# Patient Record
Sex: FEMALE | NOT HISPANIC OR LATINO | URBAN - METROPOLITAN AREA
[De-identification: names, ages, dates, MRNs, and addresses within clinical notes are randomized per-mention and may not be internally consistent; named-entity substitution may affect disease eponyms.]

---

## 2020-07-17 ENCOUNTER — OFFICE VISIT (OUTPATIENT)
Dept: URBAN - METROPOLITAN AREA CLINIC 54 | Facility: CLINIC | Age: 55
End: 2020-07-17

## 2020-08-07 ENCOUNTER — OFFICE VISIT (OUTPATIENT)
Dept: URBAN - METROPOLITAN AREA CLINIC 54 | Facility: CLINIC | Age: 55
End: 2020-08-07

## 2020-08-14 ENCOUNTER — OFFICE VISIT (OUTPATIENT)
Dept: URBAN - METROPOLITAN AREA CLINIC 54 | Facility: CLINIC | Age: 55
End: 2020-08-14
Payer: COMMERCIAL

## 2020-08-14 DIAGNOSIS — R74.8 ABNORMAL LIVER ENZYMES: ICD-10-CM

## 2020-08-14 PROCEDURE — 1036F TOBACCO NON-USER: CPT | Performed by: INTERNAL MEDICINE

## 2020-08-14 PROCEDURE — 99213 OFFICE O/P EST LOW 20 MIN: CPT | Performed by: INTERNAL MEDICINE

## 2020-08-14 PROCEDURE — G8427 DOCREV CUR MEDS BY ELIG CLIN: HCPCS | Performed by: INTERNAL MEDICINE

## 2020-08-14 PROCEDURE — 3017F COLORECTAL CA SCREEN DOC REV: CPT | Performed by: INTERNAL MEDICINE

## 2020-08-14 RX ORDER — LOSARTAN POTASSIUM 25 MG/1
TABLET, FILM COATED ORAL
Qty: 0 | Refills: 0 | Status: ACTIVE | COMMUNITY
Start: 1900-01-01

## 2020-08-14 RX ORDER — ATORVASTATIN CALCIUM 20 MG/1
TABLET, FILM COATED ORAL
Qty: 0 | Refills: 0 | Status: ACTIVE | COMMUNITY
Start: 1900-01-01

## 2020-08-14 NOTE — HPI-TODAY'S VISIT:
6-month follow-up for abnormal liver profile.  She feels physically well.  Her   in March from a pulmonary embolism following surgery.  She has no ongoing GI or liver issues.  She remains on the same medications.  Prior evaluation revealed no treatable forms of liver disease.

## 2020-08-14 NOTE — HPI-OTHER HISTORIES
>>>prior visit  The patient is a 54 year old female, who presents on referral from Renee Parrish MD, for a gastroenterology evaluation for elevated liver enzymes. A copy of this document will be sent to the referring provider. Recent liver function tests, AST and ALT , are elevated (see labs which were reviewed in the patients records ). ALT>AST both <180, normal last year, not improved after stopping statin 2 months. The patient reports no significant symptoms related to the elevated LFT's. The patient relates no significant family or personal history of liver disease. She states no history of new medications or alcohol use. The patient reports a personal history of no other habits that could cause liver damage. Mild transient RUQ pain intermittently, lasting minutes. Interval testing includes: CMP. The patient would also like to be evaluated for a screening colonoscopy. The patient has never had a colonoscopy before. The patient does not have any risk factors for colon cancer, but is over the recommended age for screening. There is no recent history of rectal bleeding. The patient has no pertinent additional complaints of constipation, diarrhea, and weight loss. >>> 10/25/2019 No treatable forms of liver disease identified. No fibrosis on FibroSure, so can avoid a liver biopsy for now. Hemochromatosis DNA negative, no autoimmune or viral hepatitis.Feels well, has lost 10 lbs volitionally. Recent screening colonoscopy was normal.

## 2020-08-21 LAB
A/G RATIO: 1.8
ALBUMIN: 4.7
ALKALINE PHOSPHATASE: 78
ALT (SGPT): 48
AST (SGOT): 41
BASO (ABSOLUTE): 0.1
BASOS: 1
BILIRUBIN, TOTAL: 0.5
BUN/CREATININE RATIO: 13
BUN: 9
CALCIUM: 9.9
CARBON DIOXIDE, TOTAL: 22
CHLORIDE: 99
CREATININE: 0.72
EGFR IF AFRICN AM: 111
EGFR IF NONAFRICN AM: 96
EOS (ABSOLUTE): 0.2
EOS: 2
GLOBULIN, TOTAL: 2.6
GLUCOSE: 90
HEMATOCRIT: 42.2
HEMATOLOGY COMMENTS:: (no result)
HEMOGLOBIN: 13.3
IMMATURE CELLS: (no result)
IMMATURE GRANS (ABS): 0
IMMATURE GRANULOCYTES: 0
INR: 1
LYMPHS (ABSOLUTE): 3
LYMPHS: 30
MCH: 25.4
MCHC: 31.5
MCV: 81
MONOCYTES(ABSOLUTE): 0.9
MONOCYTES: 9
NEUTROPHILS (ABSOLUTE): 5.7
NEUTROPHILS: 58
NRBC: (no result)
PLATELETS: 321
POTASSIUM: 4.3
PROTEIN, TOTAL: 7.3
PROTHROMBIN TIME: 11
RBC: 5.24
RDW: 13.5
SODIUM: 141
WBC: 9.8

## 2020-09-03 ENCOUNTER — TELEPHONE ENCOUNTER (OUTPATIENT)
Dept: URBAN - METROPOLITAN AREA CLINIC 54 | Facility: CLINIC | Age: 55
End: 2020-09-03

## 2021-02-19 ENCOUNTER — WEB ENCOUNTER (OUTPATIENT)
Dept: URBAN - METROPOLITAN AREA CLINIC 54 | Facility: CLINIC | Age: 56
End: 2021-02-19

## 2021-02-19 ENCOUNTER — OFFICE VISIT (OUTPATIENT)
Dept: URBAN - METROPOLITAN AREA CLINIC 54 | Facility: CLINIC | Age: 56
End: 2021-02-19
Payer: COMMERCIAL

## 2021-02-19 DIAGNOSIS — R74.8 ABNORMAL LIVER ENZYMES: ICD-10-CM

## 2021-02-19 DIAGNOSIS — K76.0 FATTY LIVER: ICD-10-CM

## 2021-02-19 PROCEDURE — G8482 FLU IMMUNIZE ORDER/ADMIN: HCPCS | Performed by: INTERNAL MEDICINE

## 2021-02-19 PROCEDURE — 99213 OFFICE O/P EST LOW 20 MIN: CPT | Performed by: INTERNAL MEDICINE

## 2021-02-19 PROCEDURE — G9903 PT SCRN TBCO ID AS NON USER: HCPCS | Performed by: INTERNAL MEDICINE

## 2021-02-19 PROCEDURE — G8420 CALC BMI NORM PARAMETERS: HCPCS | Performed by: INTERNAL MEDICINE

## 2021-02-19 PROCEDURE — 3017F COLORECTAL CA SCREEN DOC REV: CPT | Performed by: INTERNAL MEDICINE

## 2021-02-19 RX ORDER — ATORVASTATIN CALCIUM 20 MG/1
TABLET, FILM COATED ORAL
Qty: 0 | Refills: 0 | Status: ACTIVE | COMMUNITY
Start: 1900-01-01

## 2021-02-19 RX ORDER — LOSARTAN POTASSIUM 25 MG/1
TABLET, FILM COATED ORAL
Qty: 0 | Refills: 0 | Status: ACTIVE | COMMUNITY
Start: 1900-01-01

## 2021-02-19 NOTE — HPI-TODAY'S VISIT:
55-year-old female seen in follow-up for fatty liver.  She feels well with no specific complaints and no digestive issues.  She brings with her recent lab work which is nearly normal except for minimal elevation of  ALT.  No anemia.  Negative colonoscopy and ultrasound 2019.  FibroSure negative for fibrosis.  Treatable forms of liver disease screening and negative.  No cardiac or respiratory problems.  No genitourinary problems.  She is back on a statin fish oil garlic losartan

## 2021-08-20 ENCOUNTER — OFFICE VISIT (OUTPATIENT)
Dept: URBAN - METROPOLITAN AREA CLINIC 54 | Facility: CLINIC | Age: 56
End: 2021-08-20

## 2021-11-18 ENCOUNTER — OFFICE VISIT (OUTPATIENT)
Dept: URBAN - METROPOLITAN AREA CLINIC 54 | Facility: CLINIC | Age: 56
End: 2021-11-18

## 2021-11-18 ENCOUNTER — OFFICE VISIT (OUTPATIENT)
Dept: URBAN - METROPOLITAN AREA CLINIC 54 | Facility: CLINIC | Age: 56
End: 2021-11-18
Payer: COMMERCIAL

## 2021-11-18 ENCOUNTER — WEB ENCOUNTER (OUTPATIENT)
Dept: URBAN - METROPOLITAN AREA CLINIC 54 | Facility: CLINIC | Age: 56
End: 2021-11-18

## 2021-11-18 ENCOUNTER — DASHBOARD ENCOUNTERS (OUTPATIENT)
Age: 56
End: 2021-11-18

## 2021-11-18 VITALS
BODY MASS INDEX: 33.65 KG/M2 | SYSTOLIC BLOOD PRESSURE: 126 MMHG | DIASTOLIC BLOOD PRESSURE: 74 MMHG | HEART RATE: 103 BPM | HEIGHT: 68 IN | WEIGHT: 222 LBS | TEMPERATURE: 98 F

## 2021-11-18 DIAGNOSIS — Z12.11 ENCOUNTER FOR SCREENING FOR MALIGNANT NEOPLASM OF COLON: ICD-10-CM

## 2021-11-18 DIAGNOSIS — R74.8 ELEVATED LIVER ENZYMES: ICD-10-CM

## 2021-11-18 DIAGNOSIS — K76.0 FATTY LIVER: ICD-10-CM

## 2021-11-18 PROBLEM — 197321007 FATTY LIVER: Status: ACTIVE | Noted: 2021-02-19

## 2021-11-18 PROCEDURE — 99213 OFFICE O/P EST LOW 20 MIN: CPT | Performed by: INTERNAL MEDICINE

## 2021-11-18 RX ORDER — B-COMPLEX WITH VITAMIN C
AS DIRECTED TABLET ORAL
Status: ACTIVE | COMMUNITY

## 2021-11-18 RX ORDER — ATORVASTATIN CALCIUM 20 MG/1
TABLET, FILM COATED ORAL
Qty: 0 | Refills: 0 | Status: ACTIVE | COMMUNITY
Start: 1900-01-01

## 2021-11-18 RX ORDER — LOSARTAN POTASSIUM 25 MG/1
TABLET, FILM COATED ORAL
Qty: 0 | Refills: 0 | Status: ACTIVE | COMMUNITY
Start: 1900-01-01

## 2021-11-18 NOTE — HPI-TODAY'S VISIT:
55-year-old female seen in follow-up for fatty liver.  She feels well with no specific complaints and no digestive issues.  She brings with her recent lab work which is nearly normal except for minimal elevation of  ALT.  No anemia.  Negative colonoscopy and ultrasound 2019.  FibroSure negative for fibrosis.  Treatable forms of liver disease screening and negative.  No cardiac or respiratory problems.  No genitourinary problems.  She is back on a statin fish oil garlic losartan.  Follow Up 11/18/21: Patient presents for routine follow up. LFT's near normalized in Feb 2021 (PCP labs). She had previouly lost 10 lbs but has regained. No new complaints.